# Patient Record
Sex: FEMALE | Race: WHITE | URBAN - METROPOLITAN AREA
[De-identification: names, ages, dates, MRNs, and addresses within clinical notes are randomized per-mention and may not be internally consistent; named-entity substitution may affect disease eponyms.]

---

## 2024-10-21 ENCOUNTER — TELEPHONE (OUTPATIENT)
Age: 19
End: 2024-10-21

## 2024-10-21 NOTE — TELEPHONE ENCOUNTER
Reached out to the patient to schedule a New Patient Appointment for Migraines. LVM to call and schedule for the next available.

## 2024-11-04 ENCOUNTER — TELEPHONE (OUTPATIENT)
Age: 19
End: 2024-11-04

## 2024-11-12 ENCOUNTER — HOSPITAL ENCOUNTER (OUTPATIENT)
Facility: HOSPITAL | Age: 19
Discharge: HOME | End: 2024-11-15
Payer: COMMERCIAL

## 2024-11-12 DIAGNOSIS — N63.10 MASS OF RIGHT BREAST, UNSPECIFIED QUADRANT: ICD-10-CM

## 2024-11-12 DIAGNOSIS — N63.20 MASS OF LEFT BREAST, UNSPECIFIED QUADRANT: ICD-10-CM

## 2024-11-12 PROCEDURE — 76642 ULTRASOUND BREAST LIMITED: CPT

## 2025-01-13 ENCOUNTER — OFFICE VISIT (OUTPATIENT)
Age: 20
End: 2025-01-13
Payer: COMMERCIAL

## 2025-01-13 VITALS
OXYGEN SATURATION: 98 % | WEIGHT: 124 LBS | SYSTOLIC BLOOD PRESSURE: 118 MMHG | BODY MASS INDEX: 19.46 KG/M2 | DIASTOLIC BLOOD PRESSURE: 70 MMHG | TEMPERATURE: 97.3 F | HEIGHT: 67 IN | HEART RATE: 89 BPM | RESPIRATION RATE: 18 BRPM

## 2025-01-13 DIAGNOSIS — M54.81 BILATERAL OCCIPITAL NEURALGIA: ICD-10-CM

## 2025-01-13 DIAGNOSIS — G43.709 CHRONIC MIGRAINE W/O AURA, NOT INTRACTABLE, W/O STAT MIGR: Primary | ICD-10-CM

## 2025-01-13 DIAGNOSIS — G43.109 OCULAR MIGRAINE: ICD-10-CM

## 2025-01-13 DIAGNOSIS — G44.86 CERVICOGENIC HEADACHE: ICD-10-CM

## 2025-01-13 PROCEDURE — 99204 OFFICE O/P NEW MOD 45 MIN: CPT | Performed by: PSYCHIATRY & NEUROLOGY

## 2025-01-13 RX ORDER — ADAPALENE AND BENZOYL PEROXIDE 3; 25 MG/G; MG/G
GEL TOPICAL
COMMUNITY
Start: 2024-10-28

## 2025-01-13 RX ORDER — RIZATRIPTAN BENZOATE 10 MG/1
TABLET, ORALLY DISINTEGRATING ORAL
Qty: 9 TABLET | Refills: 3 | Status: SHIPPED | OUTPATIENT
Start: 2025-01-13

## 2025-01-13 RX ORDER — ONDANSETRON 4 MG/1
4 TABLET, FILM COATED ORAL EVERY 8 HOURS PRN
COMMUNITY

## 2025-01-13 ASSESSMENT — PATIENT HEALTH QUESTIONNAIRE - PHQ9
SUM OF ALL RESPONSES TO PHQ QUESTIONS 1-9: 0
2. FEELING DOWN, DEPRESSED OR HOPELESS: NOT AT ALL
SUM OF ALL RESPONSES TO PHQ QUESTIONS 1-9: 0
1. LITTLE INTEREST OR PLEASURE IN DOING THINGS: NOT AT ALL
SUM OF ALL RESPONSES TO PHQ9 QUESTIONS 1 & 2: 0

## 2025-01-13 NOTE — PATIENT INSTRUCTIONS
spine, from your tailbone to your head.  Relax in this position for at least 15 to 30 seconds. Continue to breathe normally and don't hold your breath.  Repeat 2 to 4 times.  As you get used to this stretch, keep adding a little more time until you are able to relax in this position for up to 5 minutes. When you can relax for at least 2 minutes, you only need to do the exercise 1 time per session.  Goalpost stretch    Lie on your back on the floor (or a firm surface) with your knees bent and your feet about hip-width apart.  Tuck your chin, and relax your shoulders.  Reach your arms straight out to the sides.  Bend your elbows. Your arms should make an L on each side. Your palms should face up.  If you don't feel a mild stretch in your shoulders and across your chest, use a foam roller or tightly rolled towels under your spine, from your tailbone to your head.  Relax in this position for at least 15 to 30 seconds. Keep breathing normally.  Repeat 2 to 4 times.  As you get used to this stretch, keep adding a little more time until you are able to relax in this position for up to 5 minutes. When you can relax for at least 2 minutes, you only need to do the exercise 1 time each session.  Follow-up care is a key part of your treatment and safety. Be sure to make and go to all appointments, and call your doctor if you are having problems. It's also a good idea to know your test results and keep a list of the medicines you take.  Current as of: July 31, 2024  Content Version: 14.3  © 2024 [employer].   Care instructions adapted under license by [employer]. If you have questions about a medical condition or this instruction, always ask your healthcare professional. [email or web address] disclaims any warranty or liability for your use of this information.

## 2025-01-13 NOTE — PROGRESS NOTES
NEUROLOGY CLINIC NOTE    Patient ID:  Trish Donovan  [id number]  19 y.o.  2005    Date of Consultation:  January 13, 2025    Reason for Consultation:  headaches    Chief Complaint   Patient presents with    New Patient     Patient reports she has had migraines since about 6 years old. Patient reports she has had 1 migraine and about 15 headaches in the last 30 days. Patient states Excedrin does help relieve the headaches.       History of Present Illness:     There are no problems to display for this patient.    Past Medical History:   Diagnosis Date    Headache 2012      No past surgical history on file.     Current Outpatient Medications on File Prior to Visit   Medication Sig Dispense Refill    Adapalene-Benzoyl Peroxide 0.3-2.5 % GEL apply to [facility] once daily      ondansetron (ZOFRAN) 4 MG tablet Take 1 tablet by mouth every 8 hours as needed for Nausea or Vomiting      aspirin-acetaminophen-caffeine (EXCEDRIN MIGRAINE) 250-250-65 MG per tablet Take 1 tablet by mouth every 6 hours as needed for Headaches       No current facility-administered medications on file prior to visit.       Allergies   Allergen Reactions    Amoxicillin Hives      Social History     Tobacco Use    Smoking status: Never    Smokeless tobacco: Not on file   Substance Use Topics    Alcohol use: Yes     Alcohol/week: 3.0 standard drinks of alcohol     Types: 1 Cans of beer, 2 Shots of liquor per week      Family History   Problem Relation Age of Onset    Migraines Mother         Subjective:      Trish Donovan is a 19 y.o. female LH with history of anxiety who is here for further evaluation of her headaches.    Previously being seen at [facility].    Headaches ongoing since 6 years old  Ocular migraines once a year since high school  Severe headaches 1 to 2 times a month  Gradual onset  Bitemporal or more so the left side, behind the eyes, radiates bifrontal or top of the head  Pressure, throbbing and dull ache to sharp

## 2025-03-14 ENCOUNTER — OFFICE VISIT (OUTPATIENT)
Age: 20
End: 2025-03-14
Payer: COMMERCIAL

## 2025-03-14 VITALS
DIASTOLIC BLOOD PRESSURE: 72 MMHG | HEART RATE: 72 BPM | BODY MASS INDEX: 18.83 KG/M2 | WEIGHT: 120 LBS | TEMPERATURE: 97.6 F | SYSTOLIC BLOOD PRESSURE: 114 MMHG | HEIGHT: 67 IN | RESPIRATION RATE: 16 BRPM | OXYGEN SATURATION: 98 %

## 2025-03-14 DIAGNOSIS — M54.81 BILATERAL OCCIPITAL NEURALGIA: ICD-10-CM

## 2025-03-14 DIAGNOSIS — G44.86 CERVICOGENIC HEADACHE: ICD-10-CM

## 2025-03-14 DIAGNOSIS — G43.709 CHRONIC MIGRAINE W/O AURA, NOT INTRACTABLE, W/O STAT MIGR: Primary | ICD-10-CM

## 2025-03-14 DIAGNOSIS — R11.0 NAUSEA: ICD-10-CM

## 2025-03-14 DIAGNOSIS — G43.109 OCULAR MIGRAINE: ICD-10-CM

## 2025-03-14 PROCEDURE — 99214 OFFICE O/P EST MOD 30 MIN: CPT | Performed by: PSYCHIATRY & NEUROLOGY

## 2025-03-14 RX ORDER — ONDANSETRON 4 MG/1
4 TABLET, ORALLY DISINTEGRATING ORAL EVERY 12 HOURS PRN
Qty: 40 TABLET | Refills: 0 | Status: SHIPPED | OUTPATIENT
Start: 2025-03-14

## 2025-03-14 ASSESSMENT — PATIENT HEALTH QUESTIONNAIRE - PHQ9
1. LITTLE INTEREST OR PLEASURE IN DOING THINGS: NOT AT ALL
SUM OF ALL RESPONSES TO PHQ QUESTIONS 1-9: 0
2. FEELING DOWN, DEPRESSED OR HOPELESS: NOT AT ALL
SUM OF ALL RESPONSES TO PHQ QUESTIONS 1-9: 0

## 2025-03-14 NOTE — PROGRESS NOTES
NEUROLOGY CLINIC NOTE    Patient ID:  Trish Donovan  [id number]  19 y.o.  2005    Date of Visit:  March 14, 2025    Reason for Visit:  headaches    Chief Complaint   Patient presents with    Migraine     2 month follow up- patient reports she has not had any migraines in the last 30 days and states her last migraine was at the end of 1/2025.       History of Present Illness:     There are no active problems to display for this patient.    Past Medical History:   Diagnosis Date    Headache 2012      No past surgical history on file.     Current Outpatient Medications on File Prior to Visit   Medication Sig Dispense Refill    Adapalene-Benzoyl Peroxide 0.3-2.5 % GEL apply to [facility] once daily      ondansetron (ZOFRAN) 4 MG tablet Take 1 tablet by mouth every 8 hours as needed for Nausea or Vomiting      aspirin-acetaminophen-caffeine (EXCEDRIN MIGRAINE) 250-250-65 MG per tablet Take 1 tablet by mouth every 6 hours as needed for Headaches      rizatriptan (MAXALT-MLT) 10 MG disintegrating tablet Take 1 tab at onset of severe headache; may repeat another in 2 hours if headaches persist 9 tablet 3     No current facility-administered medications on file prior to visit.       Allergies   Allergen Reactions    Amoxicillin Hives      Social History     Tobacco Use    Smoking status: Never    Smokeless tobacco: Not on file   Substance Use Topics    Alcohol use: Yes     Alcohol/week: 3.0 standard drinks of alcohol     Types: 1 Cans of beer, 2 Shots of liquor per week      Family History   Problem Relation Age of Onset    Migraines Mother         Subjective:      Trish Donovan is a 19 y.o. female LH with history of anxiety who is here for further evaluation of her headaches.  Patient diagnosed with migraine headaches, ocular migraine, occipital neuralgia and cervicogenic headaches and is here for follow-up.    Previously being seen at [facility].    Headaches ongoing since 6 years old  Ocular migraines once a